# Patient Record
Sex: MALE | URBAN - METROPOLITAN AREA
[De-identification: names, ages, dates, MRNs, and addresses within clinical notes are randomized per-mention and may not be internally consistent; named-entity substitution may affect disease eponyms.]

---

## 2023-04-24 ENCOUNTER — OUTPATIENT (OUTPATIENT)
Dept: OUTPATIENT SERVICES | Facility: HOSPITAL | Age: 48
LOS: 1 days | Discharge: HOME | End: 2023-04-24

## 2023-04-24 ENCOUNTER — APPOINTMENT (OUTPATIENT)
Dept: OTOLARYNGOLOGY | Facility: CLINIC | Age: 48
End: 2023-04-24
Payer: MEDICAID

## 2023-04-24 VITALS — BODY MASS INDEX: 24.49 KG/M2 | HEIGHT: 65 IN | WEIGHT: 147 LBS

## 2023-04-24 DIAGNOSIS — H61.20 IMPACTED CERUMEN, UNSPECIFIED EAR: ICD-10-CM

## 2023-04-24 DIAGNOSIS — F79 UNSPECIFIED INTELLECTUAL DISABILITIES: ICD-10-CM

## 2023-04-24 PROBLEM — Z00.00 ENCOUNTER FOR PREVENTIVE HEALTH EXAMINATION: Status: ACTIVE | Noted: 2023-04-24

## 2023-04-24 PROCEDURE — 69210 REMOVE IMPACTED EAR WAX UNI: CPT

## 2023-04-24 RX ORDER — SERTRALINE HYDROCHLORIDE 25 MG/1
TABLET, FILM COATED ORAL
Refills: 0 | Status: ACTIVE | COMMUNITY

## 2023-04-24 RX ORDER — ARIPIPRAZOLE 2 MG/1
TABLET ORAL
Refills: 0 | Status: ACTIVE | COMMUNITY

## 2023-04-24 RX ORDER — MULTIVITAMIN
TABLET ORAL
Refills: 0 | Status: ACTIVE | COMMUNITY

## 2023-04-24 NOTE — HISTORY OF PRESENT ILLNESS
[de-identified] : 48 year old male presents from a group home accompanied by formal caregiver Macy for evaluation of cerumen impaction.\par Referred by Dr. Dana Almeida\par Denies otalgia, otorrhea, changes in hearing, recent fevers or ear infections.

## 2023-04-24 NOTE — PHYSICAL EXAM
[FreeTextEntry1] : INTELLECTUAL DISABILITY [de-identified] : CAITLYN IMPACTED CERUMEN REMOVED [Normal] : mucosa is normal [Midline] : trachea located in midline position [de-identified] : GINGIVITIS

## 2023-05-25 DIAGNOSIS — H61.20 IMPACTED CERUMEN, UNSPECIFIED EAR: ICD-10-CM

## 2023-05-25 DIAGNOSIS — F79 UNSPECIFIED INTELLECTUAL DISABILITIES: ICD-10-CM
